# Patient Record
Sex: FEMALE | URBAN - METROPOLITAN AREA
[De-identification: names, ages, dates, MRNs, and addresses within clinical notes are randomized per-mention and may not be internally consistent; named-entity substitution may affect disease eponyms.]

---

## 2017-08-04 ENCOUNTER — PRE VISIT (OUTPATIENT)
Dept: OTOLARYNGOLOGY | Facility: CLINIC | Age: 42
End: 2017-08-04

## 2017-08-04 NOTE — TELEPHONE ENCOUNTER
1.  Date/reason for appt:  8/17/17    Facial Pain    2.  Referring provider:  Self    3.  Call to patient (Yes / No - short description):  Yes, left sg for pt to return call    Where's she been seen/when; what's she had done    Need email to send EVELINA (s)

## 2017-08-08 NOTE — TELEPHONE ENCOUNTER
Pt saw her regular PCP during her most recent physical at [facility]    Requested records

## 2017-08-10 NOTE — TELEPHONE ENCOUNTER
Records received from [facility].   Included  Office notes: 3/19/10, 2/6/15, 7/20/17

## 2020-06-05 RX ORDER — INDOMETHACIN 50 MG/1
50 CAPSULE ORAL
COMMUNITY

## 2020-06-16 RX ORDER — INDOMETHACIN 50 MG/1
50 CAPSULE ORAL
Status: CANCELLED | OUTPATIENT
Start: 2020-06-16

## 2021-04-13 ENCOUNTER — MEDICAL CORRESPONDENCE (OUTPATIENT)
Dept: HEALTH INFORMATION MANAGEMENT | Facility: CLINIC | Age: 46
End: 2021-04-13

## 2021-04-13 ENCOUNTER — TELEPHONE (OUTPATIENT)
Dept: OPHTHALMOLOGY | Facility: CLINIC | Age: 46
End: 2021-04-13

## 2021-04-13 NOTE — TELEPHONE ENCOUNTER
Left voicemail for patient to get scheduled to see neuro-ophthalmology.  Not sure how long patient has been having problems.  Left my direct number in order for patient to return call to schedule.     Vanessa Osman on 4/13/2021 at 4:35 PM

## 2021-04-14 ENCOUNTER — TRANSCRIBE ORDERS (OUTPATIENT)
Dept: OTHER | Age: 46
End: 2021-04-14

## 2021-04-14 DIAGNOSIS — H47.10 PAPILLEDEMA: Primary | ICD-10-CM

## 2021-04-14 NOTE — TELEPHONE ENCOUNTER
Spoke to patient.  She is asymptomatic.  Assisted with scheduling with Dr. Quintana 5/6 and patient will call if she has vision changes before then to see about moving appointment sooner.     Vanessa Osman on 4/14/2021 at 2:25 PM

## 2021-05-06 DIAGNOSIS — H53.40 VISUAL FIELD DEFECT: Primary | ICD-10-CM

## 2023-07-11 ENCOUNTER — TRANSCRIBE ORDERS (OUTPATIENT)
Dept: OTHER | Age: 48
End: 2023-07-11

## 2023-07-11 ENCOUNTER — MEDICAL CORRESPONDENCE (OUTPATIENT)
Dept: HEALTH INFORMATION MANAGEMENT | Facility: CLINIC | Age: 48
End: 2023-07-11
Payer: COMMERCIAL

## 2023-07-11 DIAGNOSIS — H47.10 PAPILLEDEMA: Primary | ICD-10-CM

## 2023-10-30 ENCOUNTER — TELEPHONE (OUTPATIENT)
Dept: OPHTHALMOLOGY | Facility: CLINIC | Age: 48
End: 2023-10-30
Payer: COMMERCIAL

## 2023-10-30 NOTE — TELEPHONE ENCOUNTER
Called and unable to lvm     I tried to enter our clinic number and a beep occurred - tried our anti  blocking number and I was not able to get thru     Ok to schedule with Dr. Quintana for next available       Karen Vargas Communication Facilitator on 10/30/2023 at 9:50 AM